# Patient Record
Sex: MALE | Race: WHITE | NOT HISPANIC OR LATINO | Employment: OTHER | URBAN - METROPOLITAN AREA
[De-identification: names, ages, dates, MRNs, and addresses within clinical notes are randomized per-mention and may not be internally consistent; named-entity substitution may affect disease eponyms.]

---

## 2024-01-23 ENCOUNTER — OFFICE VISIT (OUTPATIENT)
Dept: FAMILY MEDICINE CLINIC | Facility: CLINIC | Age: 59
End: 2024-01-23
Payer: COMMERCIAL

## 2024-01-23 VITALS
HEIGHT: 62 IN | SYSTOLIC BLOOD PRESSURE: 124 MMHG | WEIGHT: 119.6 LBS | HEART RATE: 72 BPM | DIASTOLIC BLOOD PRESSURE: 86 MMHG | OXYGEN SATURATION: 100 % | BODY MASS INDEX: 22.01 KG/M2

## 2024-01-23 DIAGNOSIS — Z12.11 COLON CANCER SCREENING: ICD-10-CM

## 2024-01-23 DIAGNOSIS — Z00.00 ENCOUNTER FOR SCREENING AND PREVENTATIVE CARE: Primary | ICD-10-CM

## 2024-01-23 DIAGNOSIS — Z00.00 ENCOUNTER FOR ANNUAL PHYSICAL EXAMINATION EXCLUDING GYNECOLOGICAL EXAMINATION IN A PATIENT OLDER THAN 17 YEARS: ICD-10-CM

## 2024-01-23 DIAGNOSIS — I10 PRIMARY HYPERTENSION: ICD-10-CM

## 2024-01-23 DIAGNOSIS — Z13.220 LIPID SCREENING: ICD-10-CM

## 2024-01-23 RX ORDER — ZOSTER VACCINE RECOMBINANT, ADJUVANTED 50 MCG/0.5
0.5 KIT INTRAMUSCULAR ONCE
Qty: 1 EACH | Refills: 1 | Status: SHIPPED | OUTPATIENT
Start: 2024-01-23 | End: 2024-01-23

## 2024-01-23 RX ORDER — PNEUMOCOCCAL VACCINE POLYVALENT 25; 25; 25; 25; 25; 25; 25; 25; 25; 25; 25; 25; 25; 25; 25; 25; 25; 25; 25; 25; 25; 25; 25 UG/.5ML; UG/.5ML; UG/.5ML; UG/.5ML; UG/.5ML; UG/.5ML; UG/.5ML; UG/.5ML; UG/.5ML; UG/.5ML; UG/.5ML; UG/.5ML; UG/.5ML; UG/.5ML; UG/.5ML; UG/.5ML; UG/.5ML; UG/.5ML; UG/.5ML; UG/.5ML; UG/.5ML; UG/.5ML; UG/.5ML
0.5 INJECTION, SOLUTION INTRAMUSCULAR; SUBCUTANEOUS ONCE
Qty: 0.5 ML | Refills: 0 | Status: SHIPPED | OUTPATIENT
Start: 2024-01-23 | End: 2024-01-23

## 2024-01-23 RX ORDER — LISINOPRIL 10 MG/1
10 TABLET ORAL
COMMUNITY
Start: 2024-01-15

## 2024-01-23 NOTE — PROGRESS NOTES
"Subjective   Sunil Day is a 58 y.o. male. Presents today for annual physical and to have his lisinopril refilled.  Chief Complaint   Patient presents with    Hypertension     New to establish Care       History Of Present Illness  He is without complaint today and reports ovreall he is pretty healthy.    Hypertension:  Lisinopril managing blood pressure well.  He has been on this for about 8-9 years.    He has history of cervical fracture some years ago.  He used to [employer].      There is no problem list on file for this patient.      Social History     Socioeconomic History    Marital status:    Tobacco Use    Smoking status: Every Day     Packs/day: 0.50     Years: 15.00     Additional pack years: 0.00     Total pack years: 7.50     Types: Cigarettes    Smokeless tobacco: Never   Vaping Use    Vaping Use: Never used   Substance and Sexual Activity    Alcohol use: Yes     Alcohol/week: 12.0 standard drinks of alcohol     Types: 12 Cans of beer per week    Drug use: Never    Sexual activity: Defer       No Known Allergies    Current Outpatient Medications on File Prior to Visit   Medication Sig Dispense Refill    lisinopril (PRINIVIL,ZESTRIL) 10 MG tablet 1 tablet.       No current facility-administered medications on file prior to visit.       Objective   Vitals:    01/23/24 0817   BP: 124/86   Pulse: 72   SpO2: 100%   Weight: 54.3 kg (119 lb 9.6 oz)   Height: 157.5 cm (62\")     Body mass index is 21.88 kg/m².    Physical Exam  Constitutional:       Appearance: He is normal weight.   HENT:      Head: Normocephalic and atraumatic.      Mouth/Throat:      Mouth: Mucous membranes are moist. Mucous membranes are dry.   Eyes:      Pupils: Pupils are equal, round, and reactive to light.   Cardiovascular:      Rate and Rhythm: Normal rate and regular rhythm.      Pulses: Normal pulses.      Heart sounds: Normal heart sounds.   Pulmonary:      Effort: Pulmonary effort is normal.      Breath sounds: Normal breath " sounds.   Abdominal:      General: Bowel sounds are normal.   Musculoskeletal:         General: Normal range of motion.   Skin:     General: Skin is warm and dry.      Capillary Refill: Capillary refill takes less than 2 seconds.   Neurological:      General: No focal deficit present.      Mental Status: He is alert.   Psychiatric:         Mood and Affect: Mood normal.       Procedures     Assessment & Plan   Diagnoses and all orders for this visit:    1. Encounter for screening and preventative care (Primary)  -     Discontinue: pneumococcal polysaccharide 23-valent (Pneumovax 23) 25 MCG/0.5ML vaccine; Inject 0.5 mL into the appropriate muscle as directed by prescriber 1 (One) Time for 1 dose.  Dispense: 0.5 mL; Refill: 0  -     Tdap Vaccine => 8yo IM (BOOSTRIX)  -     Zoster Vac Recomb Adjuvanted (Shingrix) 50 MCG/0.5ML reconstituted suspension; Inject 0.5 mL into the appropriate muscle as directed by prescriber 1 (One) Time for 1 dose. Then repeat in 2 to 6 months  Dispense: 1 each; Refill: 1  -     pneumococcal polysaccharide 23-valent (PNEUMOVAX-23) vaccine 0.5 mL    2. Colon cancer screening  -     Ambulatory Referral For Screening Colonoscopy    3. Encounter for annual physical examination excluding gynecological examination in a patient older than 17 years  -     CBC & Differential  -     Comprehensive Metabolic Panel    4. Lipid screening  -     Lipid Panel    5. Primary hypertension         Discussed Care Gaps, ordered referrals and encouraged vaccination updates.       - Pt agrees with plan of care and denies further questions/concerns today  - This document is intended for medical expert use only. Persons  reading this document without medical staff guidance may result in misinterpretation and unintended morbidity     Go to the ER for any possible life-threatening symptoms such as chest pain or shortness of air.      Please allow 3-5 business days for recommendations based on new results      I  personally spent time with this patient, preparing for the visit, reviewing tests, obtaining and/or reviewing a separately obtained history, performing a medically appropriate examination and/or evaluation, counseling and educating the patient/family/caregiver, ordering medications,  documenting information in the medical record and indepentently interpreting results.       BMI is within normal parameters. No other follow-up for BMI required.     Return in about 1 year (around 1/23/2025) for Annual physical.

## 2025-04-01 ENCOUNTER — TELEPHONE (OUTPATIENT)
Dept: FAMILY MEDICINE CLINIC | Facility: CLINIC | Age: 60
End: 2025-04-01

## 2025-04-01 NOTE — TELEPHONE ENCOUNTER
Caller: Rayna Day    Relationship: [facility]    Best call back number: [phone]*    Who is your current provider: [facility]BO[facility]    Is your current provider offboarding? NO    Who would you like your new provider to be: [facility]ULL[facility]    What are your reasons for transferring care: PATIENT DOES NOT FEEL COMFORTABLE WITH [facility]WSON.    Additional notes: REQUEST TO TRANSFER CARE

## 2025-04-18 ENCOUNTER — OFFICE VISIT (OUTPATIENT)
Dept: FAMILY MEDICINE CLINIC | Facility: CLINIC | Age: 60
End: 2025-04-18
Payer: COMMERCIAL

## 2025-04-18 VITALS
DIASTOLIC BLOOD PRESSURE: 84 MMHG | WEIGHT: 116.6 LBS | BODY MASS INDEX: 21.46 KG/M2 | OXYGEN SATURATION: 95 % | SYSTOLIC BLOOD PRESSURE: 138 MMHG | HEART RATE: 75 BPM | HEIGHT: 62 IN

## 2025-04-18 DIAGNOSIS — F17.200 CURRENT EVERY DAY SMOKER: ICD-10-CM

## 2025-04-18 DIAGNOSIS — I10 ESSENTIAL HYPERTENSION: ICD-10-CM

## 2025-04-18 DIAGNOSIS — Z12.11 ENCOUNTER FOR SCREENING FOR MALIGNANT NEOPLASM OF COLON: ICD-10-CM

## 2025-04-18 DIAGNOSIS — Z00.00 ANNUAL PHYSICAL EXAM: Primary | ICD-10-CM

## 2025-04-18 PROBLEM — M54.16 CHRONIC RADICULAR LUMBAR PAIN: Status: ACTIVE | Noted: 2018-01-18

## 2025-04-18 PROBLEM — G89.29 CHRONIC RADICULAR LUMBAR PAIN: Status: ACTIVE | Noted: 2018-01-18

## 2025-04-18 NOTE — PROGRESS NOTES
Subjective   Sunil Day is a 59 y.o. male. Presents today for   Chief Complaint   Patient presents with    Annual Exam         Patient or patient representative verbalized consent for the use of Ambient Listening during the visit with  [facility] Mahmood for chart documentation. 4/18/2025  11:41 EDT    History of Present Illness  The patient is a 59-year-old male who presents for an annual physical exam, accompanied by his wife who scheduled the appointment.    Hypertension  He has a history of hypertension, which he manages with daily lisinopril 10 mg. Blood pressure is monitored at home approximately three times per week, with readings typically in the range of 130s/80s. No symptoms indicative of uncontrolled hypertension such as chest pain, dizziness, headaches, or lower extremity edema are reported. Medication was taken today, and no refill is required at this time.  - Alleviating/Aggravating Factors: Managed with daily lisinopril 10 mg.  - Timing: Blood pressure monitored at home approximately three times per week.  - Severity: Readings typically in the range of 130s/80s; no symptoms indicative of uncontrolled hypertension.    Colonoscopy Interest  He expresses interest in undergoing a colonoscopy, having never had one before.    Dental Check-ups  Regular dental check-ups are maintained, with the most recent visit occurring two weeks ago.    General Health  No vitamins or supplements are taken, and there are no issues with fatigue or thyroid problems. The last lab work was conducted approximately one year ago through "[facility]. No hearing difficulties are reported, and good ear hygiene is maintained. There are no issues with dysphagia or oral problems, and no family history of thyroid disorders is noted. No chest pain, dizziness, dyspnea, ankle swelling, or urinary or bowel issues are reported.    Persistent Issue Between Toes  A persistent issue between the toes has been experienced for the past 2 to 3 years.  Medical attention was sought from a podiatrist who performed debridement and prescribed iodine application. The condition was diagnosed as a fungal infection. The condition seems to worsen when black socks are worn. The affected area is described as resembling dry skin or a blister. No current pain is reported, but prolonged walking can cause skin breakdown. Some remaining ointment from previous treatment is available, and its use will be continued.  - Onset: Experienced for the past 2 to 3 years.  - Location: Between the toes.  - Character: Resembling dry skin or a blister; diagnosed as a fungal infection.  - Alleviating/Aggravating Factors: Worsens when black socks are worn; prolonged walking can cause skin breakdown; prescribed iodine application.  - Severity: No current pain; condition worsens with prolonged walking.    Chronic Back Pain  A history of chronic back pain due to a previous vertebral fracture is noted.  - Character: Chronic back pain due to a previous vertebral fracture.    There is no history of seizure disorders.    SOCIAL HISTORY  - Current smoker, smoking a pack every 2-1/2 days  - Drinks alcohol, consuming one to two beers a day  - Does not vape  - Does not use illicit drugs, including marijuana    FAMILY HISTORY  - Mother is 91 and has dementia  - No family history of thyroid issues       Past Medical History:   Diagnosis Date    Hypertension        History reviewed. No pertinent surgical history.     No Known Allergies    Current Outpatient Medications on File Prior to Visit   Medication Sig Dispense Refill    lisinopril (PRINIVIL,ZESTRIL) 10 MG tablet 1 tablet.       No current facility-administered medications on file prior to visit.       Social History     Socioeconomic History    Marital status:    Tobacco Use    Smoking status: Every Day     Current packs/day: 0.50     Average packs/day: 0.5 packs/day for 15.0 years (7.5 ttl pk-yrs)     Types: Cigarettes    Smokeless tobacco:  "Never   Vaping Use    Vaping status: Never Used   Substance and Sexual Activity    Alcohol use: Yes     Alcohol/week: 12.0 standard drinks of alcohol     Types: 12 Cans of beer per week    Drug use: Never    Sexual activity: Defer     Review of Systems   Denies dizziness, fatigue, fever/chills, CP, SOA, headache, blood in urine/stool, abd pain, leg swelling.    Results         Objective   Vitals:    04/18/25 1122   BP: 138/84   Pulse: 75   SpO2: 95%   Weight: 52.9 kg (116 lb 9.6 oz)   Height: 157.5 cm (62\")     Body mass index is 21.33 kg/m².    Physical Exam  Vitals reviewed.   Constitutional:       General: He is not in acute distress.     Appearance: Normal appearance. He is not ill-appearing or toxic-appearing.   HENT:      Right Ear: Tympanic membrane, ear canal and external ear normal.      Left Ear: Tympanic membrane, ear canal and external ear normal.      Nose: No congestion or rhinorrhea.      Mouth/Throat:      Mouth: Mucous membranes are moist.      Pharynx: Oropharynx is clear. No oropharyngeal exudate or posterior oropharyngeal erythema.   Eyes:      Extraocular Movements: Extraocular movements intact.      Conjunctiva/sclera: Conjunctivae normal.      Pupils: Pupils are equal, round, and reactive to light.   Cardiovascular:      Rate and Rhythm: Normal rate and regular rhythm.      Heart sounds: Normal heart sounds.   Pulmonary:      Effort: Pulmonary effort is normal. No respiratory distress.      Breath sounds: Normal breath sounds.   Abdominal:      General: Bowel sounds are normal.      Tenderness: There is no abdominal tenderness.   Musculoskeletal:         General: Normal range of motion.   Lymphadenopathy:      Cervical: No cervical adenopathy.   Skin:     General: Skin is warm and dry.      Capillary Refill: Capillary refill takes less than 2 seconds.   Neurological:      General: No focal deficit present.      Mental Status: He is alert and oriented to person, place, and time.      Motor: No " weakness.   Psychiatric:         Behavior: Behavior normal.         Assessment & Plan  1. Hypertension  - Blood pressure readings consistently around 140s/70s-80s, one instance of 160/100, today's reading 138/84  - Pulse rate within normal limits  - Monitor blood pressure daily or every other day for the next two weeks and maintain a record  - Smoking can elevate blood pressure  - Follow-up call in two weeks to review blood pressure readings  - Consider increase in medication dosage if readings remain elevated    2. Health maintenance  - Not due for any vaccines at this time  - Colonoscopy ordered  - Schedule eye examination and follow up with dentist regularly  - Routine lab work deferred until next visit    3. Tinea pedis  - Continue using ointment prescribed by podiatrist  - Inform if no improvement, referral to podiatrist will be arranged    Follow-up  - Follow up in one year for physical examination     BMI is within normal parameters. No other follow-up for BMI required.     Return in about 1 year (around 4/18/2026) for Annual physical.     Counseled on a healthy diet. Use condoms 100% of time with sex as IUD does not protect against STIs. Eat a healthy diet and exercise routinely. Avoid smoking/alcohol and drugs. Use seatbelt 100% of time.     Discussed Care Gaps, ordered referrals and encouraged vaccination updates.       - Pt agrees with plan of care and denies further questions/concerns today  - This document is intended for medical expert use only. Persons  reading this document without medical staff guidance may result in misinterpretation and unintended morbidity     Go to the ER for any possible life-threatening symptoms such as chest pain or shortness of air.      Please allow 3-5 business days for recommendations based on new results      I personally spent time with this patient, preparing for the visit, reviewing tests, obtaining and/or reviewing a separately obtained history, performing a  medically appropriate examination and/or evaluation, counseling and educating the patient/family/caregiver, ordering medications,  documenting information in the medical record and indepentently interpreting results.

## 2025-05-02 ENCOUNTER — TELEPHONE (OUTPATIENT)
Dept: FAMILY MEDICINE CLINIC | Facility: CLINIC | Age: 60
End: 2025-05-02
Payer: COMMERCIAL

## 2025-05-02 NOTE — TELEPHONE ENCOUNTER
----- Message from Radha Bravo sent at 5/2/2025  7:30 AM EDT -----  Regarding: Call patient  Please call and get most recent bp readings.  ----- Message -----  From: Radha Bravo APRN  Sent: 5/2/2025   7:00 AM EDT  To: [facility]    Check in for bp readings.

## 2025-08-06 RX ORDER — LISINOPRIL 10 MG/1
10 TABLET ORAL DAILY
Qty: 90 TABLET | Refills: 2 | Status: SHIPPED | OUTPATIENT
Start: 2025-08-06